# Patient Record
Sex: FEMALE | Race: WHITE
[De-identification: names, ages, dates, MRNs, and addresses within clinical notes are randomized per-mention and may not be internally consistent; named-entity substitution may affect disease eponyms.]

---

## 2022-06-27 ENCOUNTER — APPOINTMENT (OUTPATIENT)
Dept: ORTHOPEDIC SURGERY | Facility: CLINIC | Age: 72
End: 2022-06-27

## 2022-06-27 VITALS — WEIGHT: 133 LBS | HEIGHT: 64 IN | BODY MASS INDEX: 22.71 KG/M2

## 2022-06-27 DIAGNOSIS — Z78.9 OTHER SPECIFIED HEALTH STATUS: ICD-10-CM

## 2022-06-27 DIAGNOSIS — I10 ESSENTIAL (PRIMARY) HYPERTENSION: ICD-10-CM

## 2022-06-27 PROBLEM — Z00.00 ENCOUNTER FOR PREVENTIVE HEALTH EXAMINATION: Status: ACTIVE | Noted: 2022-06-27

## 2022-06-27 PROCEDURE — 20611 DRAIN/INJ JOINT/BURSA W/US: CPT

## 2022-06-27 PROCEDURE — 99204 OFFICE O/P NEW MOD 45 MIN: CPT | Mod: 25

## 2022-06-27 PROCEDURE — 73564 X-RAY EXAM KNEE 4 OR MORE: CPT | Mod: 50

## 2022-06-27 NOTE — DISCUSSION/SUMMARY
[de-identified] : Cortisone Knee Injection - Right\par The risks, benefits, and alternatives to cortisone injection were explained in full to the patient.  Risks outlined include but are not limited to infection, sepsis, bleeding, scarring, skin discoloration, temporary increase in pain, syncopal episode, failure to resolve symptoms, allergic reaction, symptom recurrence, and elevation of blood sugar in diabetics.  Patient understood the risks.  All questions were answered.  After discussion of options, patient requested an injection.  Oral informed consent was obtained and sterile prep was done of the injection site.  A mixture of 40mg of Kenalog, 2cc of 1% Lidocaine was sterilely prepared by me.  Sterile technique was used to introduce the mixture into the right knee. Ultrasound was used for proper needle placement.  Patient tolerated the procedure well.  Advised to ice the injection site this evening. \par \par Home exercise \par \par -----------------------------------------------\par Home Exercise\par The patient is instructed on a home exercise program.\par \par CNADY MEJIA Acting as a Scribe for Dr. Puckett\par I, Candy Mejia, attest that this documentation has been prepared under the direction and in the presence of Provider Jesse Puckett MD.\par \par Activity Modification\par The patient was advised to modify their activities.\par \par Dx / Natural History\par The patient was advised of the diagnosis.  The natural history of the pathology was explained in full to the patient in layman's terms.  Several different treatment options were discussed and explained in full to the patient including the risks and benefits of both surgical and non-surgical treatments.  All questions and concerns were answered.\par \par Pain Guide Activities\par The patient was advised to let pain guide the gradual advancement of activities.\par \par RICE\par I explained to the patient that rest, ice, compression, and elevation would benefit them.  They may return to activity after follow-up or when they no longer have any pain.

## 2022-06-27 NOTE — HISTORY OF PRESENT ILLNESS
[de-identified] : The patient is a 71 year old R hand dominant F who presents today complaining of BILATERAL KNEE PAIN.  Right knee worse than left. Medial right knee pain worse when lying down, admits to stiffness and mild swelling. Left knee posterior pain, worse when sitting, admits to stiffness. Carefully navigating stairs due to discomfort. Denies injury or trauma. Minimal relief with tylenol. Has taken less advil due to noticeable bruising. \par Date of Injury/Onset: 6/20/2022\par Pain:    At Rest:  5/10 \par With Activity:     8/10 \par Mechanism of injury: NONE\par This is NOT a Work Related Injury being treated under Worker's Compensation.\par This is NOT an athletic injury occurring associated with an interscholastic or organized sports team.\par Quality of symptoms: SHARP . STABBING , ACHING \par Improves with: LIDOCAINE CREAM , ICE\par Worse with: LYING DOWN TO SLEEP, OVERUSE \par Prior treatment: NONE \par Prior Imaging: NONE \par Out of work/sport: _, since _\par School/Sport/Position/Occupation: RETIRED \par Additional Information: None\par

## 2022-06-27 NOTE — PHYSICAL EXAM
[de-identified] : Neurologic: normal coordination, normal DTR UE/LE , normal sensation, normal mood and affect, orientated and able to communicate. \par Skin: normal skin, no rash, no ulcers and no lesions. \par Lymphatic: no obvious lymphadenopathy in areas examined. \par Constitutional: well developed and well nourished. \par Cardiovascular: peripheral vascular exam is grossly normal. \par Pulmonary: no respiratory distress, lungs clear to auscultation bilaterally. \par Abdomen: normal bowel sounds, non-tender, no HSM and no mass. \par \par Right Knee:\par ROM: \par Medial joint line tenderness\par Medial facet of patella tenderness\par Positive Melva's test \par \par X-Ray 4-view Knees: Right Knee: Grade 3 medial OA. Patella dean.\par Left Knee: Unremarkable \par \par Left Knee: \par ROM:

## 2022-08-08 ENCOUNTER — APPOINTMENT (OUTPATIENT)
Dept: ORTHOPEDIC SURGERY | Facility: CLINIC | Age: 72
End: 2022-08-08

## 2022-08-08 PROCEDURE — 99214 OFFICE O/P EST MOD 30 MIN: CPT

## 2022-08-08 NOTE — DISCUSSION/SUMMARY
[de-identified] : Patient will continue home exercise. She will follow up if symptoms return, in which case we may consider injections or MRI.\par \par "Written by Nacho Portillo, acting as Scribe for Jesse Puckett M.D" \par \par Home Exercise \par \par  The patient is instructed on a home exercise program. \par  \par RICE \par I explained to the patient that rest, ice, compression, and elevation would benefit them.  They may return to activity after follow-up or when they no longer have any pain. \par  \par Pain Guide Activities \par The patient was advised to let pain guide the gradual advancement of activities. \par  \par Activity Modification \par The patient was advised to modify their activities. \par  \par Dx / Natural History \par The patient was advised of the diagnosis.  The natural history of the pathology was explained in full to the patient in layman's terms.  Several different treatment options were discussed and explained in full to the patient including the risks and benefits of both surgical and non-surgical treatments.  All questions and concerns were answered.\par

## 2022-08-08 NOTE — HISTORY OF PRESENT ILLNESS
[de-identified] : 8/8/22: Patient presents for follow up visit bilateral knees. Last visit she Recieved bilateral CSI, she reports that she is experiencing significant relief from the injections and has only minor aches in the knees. \par \par The patient is a 71 year old R hand dominant F who presents today complaining of BILATERAL KNEE PAIN. Right knee worse than left. Medial right knee pain worse when lying down, admits to stiffness and mild swelling. Left knee posterior pain, worse when sitting, admits to stiffness. Carefully navigating stairs due to discomfort. Denies injury or trauma. Minimal relief with tylenol. Has taken less advil due to noticeable bruising. \par Date of Injury/Onset: 6/20/2022\par Pain: At Rest: 3/10 \par With Activity: 3/10 \par Mechanism of injury: NONE\par This is NOT a Work Related Injury being treated under Worker's Compensation.\par This is NOT an athletic injury occurring associated with an interscholastic or organized sports team.\par Quality of symptoms: SHARP. STABBING , ACHING \par Improves with: LIDOCAINE CREAM , ICE\par Worse with: LYING DOWN TO SLEEP, OVERUSE \par Prior treatment: NONE \par Prior Imaging: NONE \par Out of work/sport: _, since _\par School/Sport/Position/Occupation: RETIRED \par Additional Information: None

## 2022-08-08 NOTE — PHYSICAL EXAM
[de-identified] : \par Neurologic: normal coordination, normal DTR UE/LE , normal sensation, normal mood and affect, orientated and able to communicate. \par Skin: normal skin, no rash, no ulcers and no lesions. \par Lymphatic: no obvious lymphadenopathy in areas examined. \par Constitutional: well developed and well nourished. \par Cardiovascular: peripheral vascular exam is grossly normal. \par Pulmonary: no respiratory distress, lungs clear to auscultation bilaterally. \par Abdomen: normal bowel sounds, non-tender, no HSM and no mass. \par \par Right Knee:\par ROM: \par Medial joint line tenderness\par Medial facet of patella tenderness\par Positive Melva's test \par \par X-Ray 4-view Knees: Right Knee: Grade 3 medial OA. Patella dean.\par Left Knee: Unremarkable \par \par Left Knee: \par ROM:

## 2023-09-06 ENCOUNTER — APPOINTMENT (OUTPATIENT)
Dept: ORTHOPEDIC SURGERY | Facility: CLINIC | Age: 73
End: 2023-09-06
Payer: MEDICARE

## 2023-09-06 VITALS — WEIGHT: 133 LBS | HEIGHT: 64 IN | BODY MASS INDEX: 22.71 KG/M2

## 2023-09-06 DIAGNOSIS — M25.562 PAIN IN RIGHT KNEE: ICD-10-CM

## 2023-09-06 DIAGNOSIS — M25.561 PAIN IN RIGHT KNEE: ICD-10-CM

## 2023-09-06 DIAGNOSIS — M71.20 SYNOVIAL CYST OF POPLITEAL SPACE [BAKER], UNSPECIFIED KNEE: ICD-10-CM

## 2023-09-06 DIAGNOSIS — M79.18 MYALGIA, OTHER SITE: ICD-10-CM

## 2023-09-06 DIAGNOSIS — M17.9 OSTEOARTHRITIS OF KNEE, UNSPECIFIED: ICD-10-CM

## 2023-09-06 PROCEDURE — 99214 OFFICE O/P EST MOD 30 MIN: CPT | Mod: 25

## 2023-09-06 PROCEDURE — 20611 DRAIN/INJ JOINT/BURSA W/US: CPT | Mod: LT

## 2023-09-06 PROCEDURE — 73564 X-RAY EXAM KNEE 4 OR MORE: CPT | Mod: 50

## 2023-09-06 NOTE — HISTORY OF PRESENT ILLNESS
[de-identified] : The patient is a 73 year old R hand dominant F who presents today complaining of BILATERAL KNEE PAIN, L worse than R.   Date of Injury/Onset: 6/20/2022 Pain: At Rest: 5/10  With Activity: 8/10  Mechanism of injury: NONE This is NOT a Work Related Injury being treated under Worker's Compensation. This is NOT an athletic injury occurring associated with an interscholastic or organized sports team. Quality of symptoms: SHARP. STABBING , ACHING  Improves with: PT, CSI  Worse with: LYING DOWN TO SLEEP, OVERUSE, COLD Treatment/Imaging since last visit: PT Out of work/sport: _, since _ School/Sport/Position/Occupation: RETIRED  Additional Information: None

## 2023-09-06 NOTE — PHYSICAL EXAM
[Bilateral] : knee bilaterally [de-identified] : Neurologic: normal coordination, normal DTR UE/LE , normal sensation, normal mood and affect, orientated and able to communicate.  Skin: normal skin, no rash, no ulcers and no lesions.  Lymphatic: no obvious lymphadenopathy in areas examined.  Constitutional: well developed and well nourished.  Cardiovascular: peripheral vascular exam is grossly normal.  Pulmonary: no respiratory distress, lungs clear to auscultation bilaterally.  Abdomen: normal bowel sounds, non-tender, no HSM and no mass.   Right Knee: ROM:  Medial joint line tenderness Medial facet of patella tenderness Positive Melva's test   X-Ray 4-view Knees: Right Knee: Grade 3 medial OA. Patella dean. Left Knee: Unremarkable   Left Knee:  ROM:   [] : no effusion [[id number]] : Grade 3 b/l medial OA

## 2023-09-06 NOTE — DISCUSSION/SUMMARY
[de-identified] : X-rays reviewed and discussed.  As the LT knee is more bothersome, discussed a LT knee cortisone injection. She states she got 1 year of relief from the last cortisone injection.  She will follow up in 6 weeks.   RB&A to injection discussed. Patient wished to move forward with injection/s. Injected CSI to Left knee. Patient tolerated well.  Cortisone Knee Injection - Left The risks, benefits, and alternatives to cortisone injection were explained in full to the patient.  Risks outlined include but are not limited to infection, sepsis, bleeding, scarring, skin discoloration, temporary increase in pain, syncopal episode, failure to resolve symptoms, allergic reaction, symptom recurrence, and elevation of blood sugar in diabetics.  Patient understood the risks.  All questions were answered.  After discussion of options, patient requested an injection.  Oral informed consent was obtained and sterile prep was done of the injection site.  A mixture of 40mg of Kenalog, 2cc of 1% Lidocaine was sterilely prepared by me.  Sterile technique was used to introduce the mixture into the left knee. Ultrasound was used for proper needle placement.  Patient tolerated the procedure well.  Advised to ice the injection site this evening.

## 2023-10-18 ENCOUNTER — APPOINTMENT (OUTPATIENT)
Dept: ORTHOPEDIC SURGERY | Facility: CLINIC | Age: 73
End: 2023-10-18